# Patient Record
Sex: FEMALE | Race: ASIAN | NOT HISPANIC OR LATINO
[De-identification: names, ages, dates, MRNs, and addresses within clinical notes are randomized per-mention and may not be internally consistent; named-entity substitution may affect disease eponyms.]

---

## 2021-08-17 PROBLEM — Z00.00 ENCOUNTER FOR PREVENTIVE HEALTH EXAMINATION: Status: ACTIVE | Noted: 2021-08-17

## 2021-08-20 ENCOUNTER — APPOINTMENT (OUTPATIENT)
Dept: UROLOGY | Facility: CLINIC | Age: 86
End: 2021-08-20

## 2021-09-14 ENCOUNTER — APPOINTMENT (OUTPATIENT)
Dept: UROLOGY | Facility: CLINIC | Age: 86
End: 2021-09-14
Payer: MEDICARE

## 2021-09-14 VITALS
RESPIRATION RATE: 16 BRPM | BODY MASS INDEX: 16.56 KG/M2 | DIASTOLIC BLOOD PRESSURE: 71 MMHG | TEMPERATURE: 98 F | SYSTOLIC BLOOD PRESSURE: 106 MMHG | HEART RATE: 75 BPM | HEIGHT: 62 IN | WEIGHT: 90 LBS | OXYGEN SATURATION: 97 %

## 2021-09-14 DIAGNOSIS — R53.1 WEAKNESS: ICD-10-CM

## 2021-09-14 DIAGNOSIS — Z97.8 PRESENCE OF OTHER SPECIFIED DEVICES: ICD-10-CM

## 2021-09-14 DIAGNOSIS — A49.9 URINARY TRACT INFECTION, SITE NOT SPECIFIED: ICD-10-CM

## 2021-09-14 DIAGNOSIS — R13.10 DYSPHAGIA, UNSPECIFIED: ICD-10-CM

## 2021-09-14 DIAGNOSIS — R53.2 FUNCTIONAL QUADRIPLEGIA: ICD-10-CM

## 2021-09-14 DIAGNOSIS — I61.9 NONTRAUMATIC INTRACEREBRAL HEMORRHAGE, UNSPECIFIED: ICD-10-CM

## 2021-09-14 DIAGNOSIS — N39.0 URINARY TRACT INFECTION, SITE NOT SPECIFIED: ICD-10-CM

## 2021-09-14 PROCEDURE — 99203 OFFICE O/P NEW LOW 30 MIN: CPT

## 2021-09-14 RX ORDER — ATORVASTATIN CALCIUM 80 MG/1
80 TABLET, FILM COATED ORAL
Refills: 0 | Status: ACTIVE | COMMUNITY

## 2021-09-14 RX ORDER — METOPROLOL SUCCINATE 25 MG/1
25 TABLET, EXTENDED RELEASE ORAL
Refills: 0 | Status: ACTIVE | COMMUNITY

## 2021-09-14 NOTE — LETTER BODY
[FreeTextEntry1] : Eliud Hi MD\par [address], \[place] [address]\par [phone]\par \par Dear Dr. Hi, \par \par Reason for Visit: Urinary retention. Neurogenic bladder. \par \par This is a 89 year-old wheelchair dependent Mandarin-speaking woman with a history of left and right hemorrhagic stroke presenting with urinary retention and neurogenic bladder. The patient had a left hemorrhagic stroke 10 years ago. She now has left-sided weakness.  Patient is referred for evaluation of her condition. She has a Roberts catheter in place for her urinary retention. She notes a recent bacterial UTI. Patient denies any gross hematuria or urinary incontinence. The patient denies any aggravating or relieving factors. The patient denies any interference of function. The patient is entirely asymptomatic. All other review of systems are negative. She has no cancer in her family medical history. She has no previous surgical history. Past medical history, family history and social history were inquired and were noncontributory to current condition. The patient does not use tobacco or drink alcohol. Medications and allergies were reviewed. She has no known allergies to medication. \par \par On examination, the patient is a elderly-appearing wheelchair dependent woman in no acute distress. She is unable to stand or transfer.  She is alert and oriented and follows commands. She  has normal mood and affect. She is normocephalic. Oral no thrush. Neck is supple. Respirations are unlabored. Abdomen is soft and nontender. Liver is nonpalpable. Bladder is nonpalpable. No CVA tenderness. Neurologically she is grossly intact. No peripheral edema. Skin without gross abnormality.\par \par Assessment: Urinary retention. Neurogenic bladder. \par \par I counseled the patient. I discussed the various etiologies of her symptoms. In terms of her urinary retention, the patient currently has a Roberts catheter in place. I recommended she continue with Roberts catheter changes once a month. In terms of her neurogenic bladder, I encouraged hydration. I encouraged the patient to follow up in the [place] office due to her mobility issues.  Patient understands that if she develops gross hematuria or any urinary discomfort, she will contact me for further evaluation. Risks and alternatives were discussed. I answered the patient's questions. The patient will follow-up as directed and will contact me with any questions or concerns. Thank you for the opportunity to participate in the care of Ms. TAN. I will keep you updated on her progress.\par \par Plan: Continue with monthly Roberts changes. Hydration.  Follow up as directed.

## 2021-09-14 NOTE — ADDENDUM
[FreeTextEntry1] : Entered by Nancy Egan, acting as scribe for Dr. Charly Prakash.\par \par The documentation recorded by the scribe accurately reflects the service I personally performed and the decisions made by me.

## 2021-09-14 NOTE — HISTORY OF PRESENT ILLNESS
[FreeTextEntry1] : Please refer to URO Consult note \par \par pt previous left hemorrhagic stroke 10 years ago \par right hemorrhagic stroke \par left sided weakness \par wheel chair dependent \par unable to stand or transfer \par was able to answer questions \par urinary retention neurogenic bladder \par on Roberts \par continue Roberts change once a moth \par consider  office for mobility issue \par hydration \par

## 2021-09-23 ENCOUNTER — APPOINTMENT (OUTPATIENT)
Dept: UROLOGY | Facility: CLINIC | Age: 86
End: 2021-09-23
Payer: MEDICARE

## 2021-09-23 VITALS
HEIGHT: 62 IN | BODY MASS INDEX: 16.56 KG/M2 | RESPIRATION RATE: 16 BRPM | DIASTOLIC BLOOD PRESSURE: 66 MMHG | WEIGHT: 90 LBS | SYSTOLIC BLOOD PRESSURE: 106 MMHG | HEART RATE: 68 BPM

## 2021-09-23 PROCEDURE — 99213 OFFICE O/P EST LOW 20 MIN: CPT

## 2021-10-26 NOTE — LETTER BODY
[FreeTextEntry1] : \par Eliud Hi MD\par [address], \[place] [address]\par [phone]\par \par Dear Dr. Hi, \par \par Reason for Visit: Urinary retention. Neurogenic bladder.  Catheter leakage\par \par This is a 89 year-old wheelchair dependent Mandarin-speaking woman with a history of left and right hemorrhagic stroke presenting with urinary retention and neurogenic bladder. The patient had a left hemorrhagic stroke 10 years ago. She now has left-sided weakness. Patient is referred for evaluation of her condition. She has a Roberts catheter in place for her urinary retention. She notes a recent bacterial UTI. Patient denies any gross hematuria or urinary incontinence. The patient denies any aggravating or relieving factors.  Patient was accompanied by her daughter today.  Daughter reports that there is urinary leakage around catheter.  The patient denies any interference of function. The patient is entirely asymptomatic. All other review of systems are negative. She has no cancer in her family medical history. She has no previous surgical history. Past medical history, family history and social history were inquired and were noncontributory to current condition. The patient does not use tobacco or drink alcohol. Medications and allergies were reviewed. She has no known allergies to medication. \par \par On examination, the patient is a elderly-appearing wheelchair dependent woman in no acute distress. She is unable to stand or transfer. She is alert and oriented and follows commands. She has normal mood and affect. She is normocephalic. Oral no thrush. Neck is supple. Respirations are unlabored. Abdomen is soft and nontender. Liver is nonpalpable. Bladder is nonpalpable. No CVA tenderness. Neurologically she is grossly intact. No peripheral edema. Skin without gross abnormality.\par \par The Roberts catheter was interrogated and repositioned.  Patient was observed for 1 hour without evidence of any urinary difficulties.\par \par Assessment: Urinary retention. Neurogenic bladder. \par \par I counseled the patient.  I encouraged continue Roberts catheter drainage.  I have reassured the patient.  Discussed the various etiologies of her symptoms. In terms of her urinary retention, the patient currently has a Roberts catheter in place. I recommended she continue with Roberts catheter changes once a month. In terms of her neurogenic bladder, I encouraged hydration. I encouraged the patient to follow up in the [place] office due to her mobility issues. Patient understands that if she develops gross hematuria or any urinary discomfort, she will contact me for further evaluation. Risks and alternatives were discussed. I answered the patient's questions. The patient will follow-up as directed and will contact me with any questions or concerns. Thank you for the opportunity to participate in the care of Ms. TAN. I will keep you updated on her progress.\par \par Plan: Continue with monthly Roberts changes. Hydration. Follow up as directed. \par \par